# Patient Record
(demographics unavailable — no encounter records)

---

## 2025-02-21 NOTE — PHYSICAL EXAM
[No Acute Distress] : no acute distress [Well Nourished] : well nourished [Well Developed] : well developed [Well-Appearing] : well-appearing [Normal Sclera/Conjunctiva] : normal sclera/conjunctiva [PERRL] : pupils equal round and reactive to light [EOMI] : extraocular movements intact [Normal Outer Ear/Nose] : the outer ears and nose were normal in appearance [Normal Oropharynx] : the oropharynx was normal [No JVD] : no jugular venous distention [No Lymphadenopathy] : no lymphadenopathy [Supple] : supple [Thyroid Normal, No Nodules] : the thyroid was normal and there were no nodules present [No Respiratory Distress] : no respiratory distress  [No Accessory Muscle Use] : no accessory muscle use [Clear to Auscultation] : lungs were clear to auscultation bilaterally [Regular Rhythm] : with a regular rhythm [Normal S1, S2] : normal S1 and S2 [No Murmur] : no murmur heard [No Carotid Bruits] : no carotid bruits [No Abdominal Bruit] : a ~M bruit was not heard ~T in the abdomen [No Varicosities] : no varicosities [Pedal Pulses Present] : the pedal pulses are present [No Edema] : there was no peripheral edema [No Palpable Aorta] : no palpable aorta [No Extremity Clubbing/Cyanosis] : no extremity clubbing/cyanosis [Soft] : abdomen soft [Non Tender] : non-tender [Non-distended] : non-distended [No Masses] : no abdominal mass palpated [No HSM] : no HSM [Normal Bowel Sounds] : normal bowel sounds [Normal Posterior Cervical Nodes] : no posterior cervical lymphadenopathy [Normal Anterior Cervical Nodes] : no anterior cervical lymphadenopathy [No CVA Tenderness] : no CVA  tenderness [No Spinal Tenderness] : no spinal tenderness [No Joint Swelling] : no joint swelling [Grossly Normal Strength/Tone] : grossly normal strength/tone [No Rash] : no rash [Coordination Grossly Intact] : coordination grossly intact [No Focal Deficits] : no focal deficits [Normal Gait] : normal gait [Deep Tendon Reflexes (DTR)] : deep tendon reflexes were 2+ and symmetric [Normal Affect] : the affect was normal [Normal Insight/Judgement] : insight and judgment were intact [de-identified] : borderline tachy

## 2025-02-21 NOTE — PHYSICAL EXAM
[No Acute Distress] : no acute distress [Well Nourished] : well nourished [Well Developed] : well developed [Well-Appearing] : well-appearing [Normal Sclera/Conjunctiva] : normal sclera/conjunctiva [PERRL] : pupils equal round and reactive to light [EOMI] : extraocular movements intact [Normal Outer Ear/Nose] : the outer ears and nose were normal in appearance [Normal Oropharynx] : the oropharynx was normal [No JVD] : no jugular venous distention [No Lymphadenopathy] : no lymphadenopathy [Supple] : supple [Thyroid Normal, No Nodules] : the thyroid was normal and there were no nodules present [No Respiratory Distress] : no respiratory distress  [No Accessory Muscle Use] : no accessory muscle use [Clear to Auscultation] : lungs were clear to auscultation bilaterally [Regular Rhythm] : with a regular rhythm [Normal S1, S2] : normal S1 and S2 [No Murmur] : no murmur heard [No Carotid Bruits] : no carotid bruits [No Abdominal Bruit] : a ~M bruit was not heard ~T in the abdomen [No Varicosities] : no varicosities [Pedal Pulses Present] : the pedal pulses are present [No Edema] : there was no peripheral edema [No Palpable Aorta] : no palpable aorta [No Extremity Clubbing/Cyanosis] : no extremity clubbing/cyanosis [Soft] : abdomen soft [Non Tender] : non-tender [Non-distended] : non-distended [No Masses] : no abdominal mass palpated [No HSM] : no HSM [Normal Bowel Sounds] : normal bowel sounds [Normal Posterior Cervical Nodes] : no posterior cervical lymphadenopathy [Normal Anterior Cervical Nodes] : no anterior cervical lymphadenopathy [No CVA Tenderness] : no CVA  tenderness [No Spinal Tenderness] : no spinal tenderness [No Joint Swelling] : no joint swelling [Grossly Normal Strength/Tone] : grossly normal strength/tone [No Rash] : no rash [Coordination Grossly Intact] : coordination grossly intact [No Focal Deficits] : no focal deficits [Normal Gait] : normal gait [Deep Tendon Reflexes (DTR)] : deep tendon reflexes were 2+ and symmetric [Normal Affect] : the affect was normal [Normal Insight/Judgement] : insight and judgment were intact [de-identified] : borderline tachy

## 2025-02-21 NOTE — REVIEW OF SYSTEMS
[Dyspnea on Exertion] : dyspnea on exertion I personally performed the service described in the documentation recorded by the scribe in my presence, and it accurately and completely records my words and actions. [Negative] : Heme/Lymph

## 2025-02-21 NOTE — HISTORY OF PRESENT ILLNESS
[FreeTextEntry1] : ROCIO [de-identified] : He reports feeling SOB with exertion.  He feels otherwise well and offers no acute concerns at this time

## 2025-02-21 NOTE — ASSESSMENT
[FreeTextEntry1] : , , Preventative: Counseled on health promotion and disease prevention. EKG and wellness labs done Discussed routine immunizations  Heart Screen:  EKG nsr  Exertional SOB: Cardio referral provided.   Anemia: Hb = 7.3 hematology referral  follow-up labs  ER if SOB is worsening    HLD: Counseled on diet, exercise and lifestyle modifications. Advised a diet centered around whole plant based foods.  Vegetables, fruits, legumes, grains, nuts.  Advised limiting intake of refined processed foods (refined white flour products, refined sugar, soda, juice).  Limit intake of meat, dairy, eggs, oil.    No

## 2025-02-21 NOTE — HISTORY OF PRESENT ILLNESS
[FreeTextEntry1] : ROCIO [de-identified] : He reports feeling SOB with exertion.  He feels otherwise well and offers no acute concerns at this time

## 2025-02-21 NOTE — ASSESSMENT
[FreeTextEntry1] : , , Preventative: Counseled on health promotion and disease prevention. EKG and wellness labs done Discussed routine immunizations  Heart Screen:  EKG nsr  Exertional SOB: Cardio referral provided.   Anemia: Hb = 7.3 hematology referral  follow-up labs  ER if SOB is worsening    HLD: Counseled on diet, exercise and lifestyle modifications. Advised a diet centered around whole plant based foods.  Vegetables, fruits, legumes, grains, nuts.  Advised limiting intake of refined processed foods (refined white flour products, refined sugar, soda, juice).  Limit intake of meat, dairy, eggs, oil.

## 2025-02-28 NOTE — HISTORY OF PRESENT ILLNESS
[de-identified] : Patient is a 37 M with no PMHx referred for iron deficiency anemia.   Patient presented to PCP for NPA 2/11/25. At that time, had complaints of SOB/CARPENTER. Labs showed Hgb 7.3, previous baseline unknown. Patient given Hematology referral at that time. Approximately 1.5 weeks later, patient endorses productive cough and fever, with worsening shortness of breath, prompting him to present to [facility] ED on 2/24/25. Found to be COVID-19 positive. Hgb at that time was 6.5. Patient given 1U pRBC. CT A/P showed internal hemorrhoids but no active bleeding. Patient discharged with Hematology and GI referrals.   Patient endorses long standing history of rectal bleeding.  About 8 to 10 years ago, had a colonoscopy which showed internal hemorrhoids. Has not had a colonoscopy since and does not have a GI doctor. Since then, he does bleed occasionally from the rectum depending on what he eats. 1 week prior, he has had persistent bright red blood per rectum with bowel movements. Denies tarry stools, hematuria, hematemesis. Not on any medications, non-smoker, social alcohol use.

## 2025-02-28 NOTE — ASSESSMENT
[FreeTextEntry1] : Patient is a 37 M with no PMHx referred for iron deficiency anemia.   Patient presented to PCP for NPA 2/11/25. At that time, had complaints of SOB/CARPENTER. Labs showed Hgb 7.3, previous baseline unknown. Patient given Hematology referral at that time. Approximately 1.5 weeks later, patient endorses productive cough and fever, with worsening shortness of breath, prompting him to present to [facility] ED on 2/24/25. Found to be COVID-19 positive. Hgb at that time was 6.5. Patient given 1U pRBC. CT A/P showed internal hemorrhoids but no active bleeding. Patient endorses long standing history of recurrent rectal bleeding.   Plan: - Likely iron deficiency anemia from chronic GI bleeding - Sending CBC, CMP, iron studies, B12/folate - Given history of internal hemorrhoids, will avoid oral iron. Will schedule for IV iron. Consent performed - GI referral